# Patient Record
Sex: MALE | URBAN - METROPOLITAN AREA
[De-identification: names, ages, dates, MRNs, and addresses within clinical notes are randomized per-mention and may not be internally consistent; named-entity substitution may affect disease eponyms.]

---

## 2019-07-08 ENCOUNTER — IMPORTED ENCOUNTER (OUTPATIENT)
Dept: URBAN - METROPOLITAN AREA CLINIC 50 | Facility: CLINIC | Age: 74
End: 2019-07-08

## 2019-07-08 NOTE — PATIENT DISCUSSION
"""discussed with patient that the sensation of the pressure felling is probably from his sinuses ""

## 2020-08-06 ENCOUNTER — IMPORTED ENCOUNTER (OUTPATIENT)
Dept: URBAN - METROPOLITAN AREA CLINIC 50 | Facility: CLINIC | Age: 75
End: 2020-08-06

## 2020-08-31 NOTE — PATIENT DISCUSSION
Dendritic Keratitis OS: Prescribed Zirgan 0.15% gel 5x/day OS (gave handwritten script for viroptic 9x/day if unable to obtain zirgan).  RTC as directed for follow-up visit/sooner if symptoms increase/persist.

## 2020-08-31 NOTE — PATIENT DISCUSSION
INSTILL ONE TO TWO DROPS IN THE LEFT EYE AS DIRECTED BY PHYSICIAN EVER Y TWO TO FOUR HOURS FOR THE FIRST TWO DAYS, THEN ONE TO TWO DROPS FOUR

## 2021-04-17 ASSESSMENT — VISUAL ACUITY
OD_CC: J1+@ 14 IN
OS_PH: 20/25
OD_BAT: 20/30
OS_CC: 20/50-1
OD_CC: 20/30+2
OS_CC: 20/40+/-
OD_CC: J2
OD_BAT: 20/50
OS_CC: J2
OD_CC: 20/50+
OS_PH: 20/30+2
OS_BAT: 20/25-
OS_OTHER: 20/40. 20/60.
OD_OTHER: 20/50. 20/80.
OS_OTHER: 20/25-. 20/40.
OS_BAT: 20/40
OD_OTHER: 20/30. 20/30-.
OD_PH: 20/30
OS_CC: J1+@ 14 IN

## 2021-04-17 ASSESSMENT — TONOMETRY
OS_IOP_MMHG: 13
OS_IOP_MMHG: 13
OD_IOP_MMHG: 13
OD_IOP_MMHG: 14

## 2021-07-30 ENCOUNTER — PREPPED CHART (OUTPATIENT)
Dept: URBAN - METROPOLITAN AREA CLINIC 53 | Facility: CLINIC | Age: 76
End: 2021-07-30

## 2021-07-30 ASSESSMENT — VISUAL ACUITY
OD_GLARE: 20/50
OS_GLARE: 20/40
OD_CC: 20/50
OS_GLARE: 20/60
OD_GLARE: 20/80
OU_CC: J2
OS_CC: 20/40
OS_PH: 20/25
OD_PH: 20/30

## 2021-07-30 ASSESSMENT — TONOMETRY
OD_IOP_MMHG: 14
OS_IOP_MMHG: 13

## 2021-08-05 ENCOUNTER — ANNUAL COMPREHENSIVE EXAM (OUTPATIENT)
Dept: URBAN - METROPOLITAN AREA CLINIC 53 | Facility: CLINIC | Age: 76
End: 2021-08-05

## 2021-08-05 DIAGNOSIS — H25.13: ICD-10-CM

## 2021-08-05 DIAGNOSIS — H52.4: ICD-10-CM

## 2021-08-05 DIAGNOSIS — H35.362: ICD-10-CM

## 2021-08-05 PROCEDURE — 92014 COMPRE OPH EXAM EST PT 1/>: CPT

## 2021-08-05 PROCEDURE — 92015 DETERMINE REFRACTIVE STATE: CPT

## 2021-08-05 PROCEDURE — 92134 CPTRZ OPH DX IMG PST SGM RTA: CPT

## 2021-08-05 ASSESSMENT — VISUAL ACUITY
OS_CC: 20/40+2
OS_GLARE: 20/60
OS_GLARE: 20/40
OD_CC: 20/30+1
OS_PH: 20/30
OD_GLARE: 20/50
OU_CC: J1
OD_PH: 20/25
OD_GLARE: 20/150

## 2021-08-05 ASSESSMENT — TONOMETRY
OS_IOP_MMHG: 15
OD_IOP_MMHG: 15

## 2022-08-18 ENCOUNTER — COMPREHENSIVE EXAM (OUTPATIENT)
Dept: URBAN - METROPOLITAN AREA CLINIC 53 | Facility: CLINIC | Age: 77
End: 2022-08-18

## 2022-08-18 DIAGNOSIS — H52.4: ICD-10-CM

## 2022-08-18 DIAGNOSIS — H35.362: ICD-10-CM

## 2022-08-18 DIAGNOSIS — H25.13: ICD-10-CM

## 2022-08-18 PROCEDURE — 92014 COMPRE OPH EXAM EST PT 1/>: CPT

## 2022-08-18 PROCEDURE — 92015 DETERMINE REFRACTIVE STATE: CPT

## 2022-08-18 PROCEDURE — 92134 CPTRZ OPH DX IMG PST SGM RTA: CPT

## 2022-08-18 ASSESSMENT — VISUAL ACUITY
OU_CC: J1-3
OD_GLARE: 20/50
OS_GLARE: 20/50
OS_GLARE: 20/40
OD_PH: 20/25-2
OD_GLARE: 20/40

## 2022-08-18 ASSESSMENT — TONOMETRY
OD_IOP_MMHG: 16
OS_IOP_MMHG: 16

## 2022-10-19 ENCOUNTER — PRE-OP/H&P (OUTPATIENT)
Dept: URBAN - METROPOLITAN AREA CLINIC 53 | Facility: CLINIC | Age: 77
End: 2022-10-19

## 2022-10-19 DIAGNOSIS — H25.13: ICD-10-CM

## 2022-10-19 DIAGNOSIS — H35.372: ICD-10-CM

## 2022-10-19 PROCEDURE — PREOP PRE OP VISIT

## 2022-10-19 PROCEDURE — 92025CV CORNEAL TOPOGRAPHY, CV

## 2022-10-19 PROCEDURE — 92136 OPHTHALMIC BIOMETRY: CPT

## 2022-10-19 PROCEDURE — 92134 CPTRZ OPH DX IMG PST SGM RTA: CPT

## 2022-10-19 ASSESSMENT — KERATOMETRY
OS_AXISANGLE2_DEGREES: 66
OS_AXISANGLE_DEGREES: 156
OD_AXISANGLE2_DEGREES: 127
OD_K2POWER_DIOPTERS: 42.50
OD_K1POWER_DIOPTERS: 43.25
OS_K1POWER_DIOPTERS: 43.00
OS_K2POWER_DIOPTERS: 41.75
OD_AXISANGLE_DEGREES: 37

## 2022-10-19 ASSESSMENT — TONOMETRY
OS_IOP_MMHG: 17
OD_IOP_MMHG: 17

## 2022-10-19 ASSESSMENT — VISUAL ACUITY
OS_CC: 20/50
OS_PH: 20/30-1
OD_CC: 20/40-1

## 2022-10-19 NOTE — PATIENT DISCUSSION
CV OS/OD/DIST OU: Discussed with patient in detail laser-assisted vs traditional cataract surgery and all available lens options as well as their associated risks, benefits, limitations and out-of-pocket costs. Patient is a candidate for [facility] OU. Patient wishes to proceed with cataract surgery with [facility] OS. Proceed with cataract surgery OD with [facility], based on confirmation of first eye results, and patient's complaint. The patient understands that a monofocal IOL will allow him/her to see clearly at one focal point and elects to be best corrected at distance. Glasses will be needed full time for near and intermediate work after surgery and there is no guarantee that they will not also require glasses to see their best at distance. The risks, benefits, and alternatives to surgery were discussed and the patient's questions were answered.

## 2022-10-19 NOTE — PATIENT DISCUSSION
Biometry notes- Pt would like custom vision and understands he will have to wear glasses for up close.  Per Pt he would like the OS done 1st he is scheudled for OD 1st.

## 2022-10-27 ENCOUNTER — SURGERY/PROCEDURE (OUTPATIENT)
Dept: URBAN - METROPOLITAN AREA SURGERY 16 | Facility: SURGERY | Age: 77
End: 2022-10-27

## 2022-10-27 ENCOUNTER — POST-OP (OUTPATIENT)
Dept: URBAN - METROPOLITAN AREA CLINIC 48 | Facility: LOCATION | Age: 77
End: 2022-10-27

## 2022-10-27 DIAGNOSIS — Z98.42: ICD-10-CM

## 2022-10-27 DIAGNOSIS — Z96.1: ICD-10-CM

## 2022-10-27 DIAGNOSIS — H25.12: ICD-10-CM

## 2022-10-27 PROCEDURE — 99199PCV CUSTOM VISION

## 2022-10-27 PROCEDURE — 66984CV REMOVE CATARACT, INSERT LENS, CUSTOM VISION

## 2022-10-27 ASSESSMENT — KERATOMETRY
OS_AXISANGLE_DEGREES: 156
OS_AXISANGLE2_DEGREES: 66
OS_AXISANGLE2_DEGREES: 66
OD_AXISANGLE2_DEGREES: 127
OD_K2POWER_DIOPTERS: 42.50
OS_K1POWER_DIOPTERS: 43.00
OD_K1POWER_DIOPTERS: 43.25
OD_AXISANGLE2_DEGREES: 127
OS_K2POWER_DIOPTERS: 41.75
OD_AXISANGLE_DEGREES: 37
OD_K1POWER_DIOPTERS: 43.25
OS_K2POWER_DIOPTERS: 41.75
OS_K1POWER_DIOPTERS: 43.00
OS_AXISANGLE_DEGREES: 156
OD_AXISANGLE_DEGREES: 37
OD_K2POWER_DIOPTERS: 42.50

## 2022-10-27 ASSESSMENT — VISUAL ACUITY: OS_SC: 20/60

## 2022-10-27 ASSESSMENT — TONOMETRY: OS_IOP_MMHG: 8

## 2022-11-02 ENCOUNTER — POST-OP (OUTPATIENT)
Dept: URBAN - METROPOLITAN AREA CLINIC 53 | Facility: CLINIC | Age: 77
End: 2022-11-02

## 2022-11-02 DIAGNOSIS — Z98.42: ICD-10-CM

## 2022-11-02 DIAGNOSIS — H25.11: ICD-10-CM

## 2022-11-02 PROCEDURE — 92136 - 2N OPHTHALMIC BIOMETRY BY PARTIAL COHERENCE INTERFEROMETRY WITH INTRAOCULAR LENS POWER CALCULATION

## 2022-11-02 PROCEDURE — 99213 OFFICE O/P EST LOW 20 MIN: CPT

## 2022-11-02 ASSESSMENT — VISUAL ACUITY
OD_PH: 20/30-1
OD_CC: 20/40-1
OS_PH: 20/25
OD_GLARE: 20/40
OD_GLARE: 20/50
OS_SC: 20/30

## 2022-11-02 ASSESSMENT — TONOMETRY
OD_IOP_MMHG: 17
OS_IOP_MMHG: 16

## 2022-11-02 ASSESSMENT — KERATOMETRY
OD_K2POWER_DIOPTERS: 42.50
OS_AXISANGLE_DEGREES: 156
OS_K1POWER_DIOPTERS: 43.00
OS_K2POWER_DIOPTERS: 41.75
OS_AXISANGLE2_DEGREES: 66
OD_K1POWER_DIOPTERS: 43.25
OD_AXISANGLE_DEGREES: 37
OD_AXISANGLE2_DEGREES: 127

## 2022-11-10 ENCOUNTER — POST-OP (OUTPATIENT)
Dept: URBAN - METROPOLITAN AREA CLINIC 49 | Facility: CLINIC | Age: 77
End: 2022-11-10

## 2022-11-10 ENCOUNTER — SURGERY/PROCEDURE (OUTPATIENT)
Dept: URBAN - METROPOLITAN AREA SURGERY 16 | Facility: SURGERY | Age: 77
End: 2022-11-10

## 2022-11-10 DIAGNOSIS — H25.11: ICD-10-CM

## 2022-11-10 DIAGNOSIS — Z98.41: ICD-10-CM

## 2022-11-10 PROCEDURE — 99199PCV CUSTOM VISION

## 2022-11-10 PROCEDURE — 66984CV REMOVE CATARACT, INSERT LENS, CUSTOM VISION

## 2022-11-10 ASSESSMENT — KERATOMETRY
OD_K2POWER_DIOPTERS: 42.50
OS_AXISANGLE2_DEGREES: 66
OS_AXISANGLE_DEGREES: 156
OS_K2POWER_DIOPTERS: 41.75
OD_K1POWER_DIOPTERS: 43.25
OS_K1POWER_DIOPTERS: 43.00
OD_AXISANGLE_DEGREES: 37
OD_AXISANGLE2_DEGREES: 127

## 2022-11-10 ASSESSMENT — VISUAL ACUITY: OD_SC: 20/60-1

## 2022-11-10 ASSESSMENT — TONOMETRY: OD_IOP_MMHG: 17

## 2022-11-16 ENCOUNTER — POST-OP (OUTPATIENT)
Dept: URBAN - METROPOLITAN AREA CLINIC 53 | Facility: CLINIC | Age: 77
End: 2022-11-16

## 2022-11-16 DIAGNOSIS — Z96.1: ICD-10-CM

## 2022-11-16 DIAGNOSIS — Z98.41: ICD-10-CM

## 2022-11-16 PROCEDURE — 99024 POSTOP FOLLOW-UP VISIT: CPT

## 2022-11-16 ASSESSMENT — VISUAL ACUITY
OD_PH: 20/25-1
OS_SC: 20/20
OD_SC: 20/30-1
OU_CC: J1+

## 2022-11-16 ASSESSMENT — KERATOMETRY
OD_K1POWER_DIOPTERS: 41.75
OS_AXISANGLE2_DEGREES: 159
OD_K2POWER_DIOPTERS: 43.50
OS_AXISANGLE_DEGREES: 69
OS_K1POWER_DIOPTERS: 42.25
OS_K2POWER_DIOPTERS: 43.75
OD_AXISANGLE_DEGREES: 108
OD_AXISANGLE2_DEGREES: 18

## 2022-11-16 ASSESSMENT — TONOMETRY
OD_IOP_MMHG: 16
OS_IOP_MMHG: 16

## 2022-12-07 ENCOUNTER — POST-OP (OUTPATIENT)
Dept: URBAN - METROPOLITAN AREA CLINIC 53 | Facility: CLINIC | Age: 77
End: 2022-12-07

## 2022-12-07 PROCEDURE — 92015 DETERMINE REFRACTIVE STATE: CPT

## 2022-12-07 PROCEDURE — 99024 POSTOP FOLLOW-UP VISIT: CPT

## 2022-12-07 ASSESSMENT — KERATOMETRY
OD_AXISANGLE2_DEGREES: 18
OS_K1POWER_DIOPTERS: 42.25
OS_AXISANGLE2_DEGREES: 159
OD_K1POWER_DIOPTERS: 41.75
OS_AXISANGLE_DEGREES: 69
OD_AXISANGLE_DEGREES: 108
OS_K2POWER_DIOPTERS: 43.75
OD_K2POWER_DIOPTERS: 43.50

## 2022-12-07 ASSESSMENT — VISUAL ACUITY
OD_SC: 20/25-1
OU_SC: J1(-1) @16IN
OS_SC: 20/25+2

## 2022-12-07 ASSESSMENT — TONOMETRY
OD_IOP_MMHG: 14
OS_IOP_MMHG: 13

## 2023-06-01 ENCOUNTER — COMPREHENSIVE EXAM (OUTPATIENT)
Dept: URBAN - METROPOLITAN AREA CLINIC 53 | Facility: CLINIC | Age: 78
End: 2023-06-01

## 2023-06-01 DIAGNOSIS — H43.813: ICD-10-CM

## 2023-06-01 DIAGNOSIS — H35.362: ICD-10-CM

## 2023-06-01 DIAGNOSIS — H26.493: ICD-10-CM

## 2023-06-01 DIAGNOSIS — H53.2: ICD-10-CM

## 2023-06-01 PROCEDURE — 92134 CPTRZ OPH DX IMG PST SGM RTA: CPT

## 2023-06-01 PROCEDURE — 92014 COMPRE OPH EXAM EST PT 1/>: CPT

## 2023-06-01 ASSESSMENT — VISUAL ACUITY
OU_SC: 20/25-1
OD_GLARE: 20/20
OD_GLARE: 20/25
OS_SC: 20/25-1
OD_SC: 20/25
OS_GLARE: 20/25
OS_GLARE: 20/25
OU_CC: J2 @ 16IN

## 2023-06-01 ASSESSMENT — TONOMETRY
OS_IOP_MMHG: 16
OD_IOP_MMHG: 14

## 2023-06-19 ENCOUNTER — CONTACT LENSES/GLASSES VISIT (OUTPATIENT)
Dept: URBAN - METROPOLITAN AREA CLINIC 53 | Facility: CLINIC | Age: 78
End: 2023-06-19

## 2023-06-19 DIAGNOSIS — H53.2: ICD-10-CM

## 2023-06-19 PROCEDURE — 92060 SENSORIMOTOR EXAMINATION: CPT

## 2023-06-19 ASSESSMENT — VISUAL ACUITY
OS_SC: 20/20
OU_SC: 20/20
OD_SC: 20/20

## 2024-06-06 ENCOUNTER — COMPREHENSIVE EXAM (OUTPATIENT)
Dept: URBAN - METROPOLITAN AREA CLINIC 53 | Facility: CLINIC | Age: 79
End: 2024-06-06

## 2024-06-06 DIAGNOSIS — H52.4: ICD-10-CM

## 2024-06-06 DIAGNOSIS — H26.493: ICD-10-CM

## 2024-06-06 DIAGNOSIS — H43.813: ICD-10-CM

## 2024-06-06 DIAGNOSIS — H53.2: ICD-10-CM

## 2024-06-06 PROCEDURE — 92015 DETERMINE REFRACTIVE STATE: CPT

## 2024-06-06 PROCEDURE — 99214 OFFICE O/P EST MOD 30 MIN: CPT

## 2024-06-06 PROCEDURE — 92134 CPTRZ OPH DX IMG PST SGM RTA: CPT

## 2024-06-06 ASSESSMENT — VISUAL ACUITY
OD_GLARE: 20/70
OS_GLARE: 20/30
OD_GLARE: 20/40
OU_CC: J1
OS_GLARE: 20/50
OD_PH: 20/25
OS_SC: 20/25
OD_SC: 20/30

## 2024-06-06 ASSESSMENT — TONOMETRY
OD_IOP_MMHG: 17
OS_IOP_MMHG: 16

## 2025-02-04 ENCOUNTER — CONTACT LENSES/GLASSES VISIT (OUTPATIENT)
Age: 80
End: 2025-02-04

## 2025-02-04 DIAGNOSIS — H53.2: ICD-10-CM

## 2025-02-04 DIAGNOSIS — H52.4: ICD-10-CM

## 2025-02-04 PROCEDURE — 92060 SENSORIMOTOR EXAMINATION: CPT

## 2025-02-04 PROCEDURE — 92015 DETERMINE REFRACTIVE STATE: CPT

## 2025-04-01 ENCOUNTER — EMERGENCY VISIT (OUTPATIENT)
Age: 80
End: 2025-04-01

## 2025-04-01 DIAGNOSIS — H43.813: ICD-10-CM

## 2025-04-01 DIAGNOSIS — H40.013: ICD-10-CM

## 2025-04-01 DIAGNOSIS — H26.493: ICD-10-CM

## 2025-04-01 PROCEDURE — 76514 ECHO EXAM OF EYE THICKNESS: CPT

## 2025-04-01 PROCEDURE — 66821 AFTER CATARACT LASER SURGERY: CPT

## 2025-04-01 PROCEDURE — 92134 CPTRZ OPH DX IMG PST SGM RTA: CPT

## 2025-04-01 PROCEDURE — 99215 OFFICE O/P EST HI 40 MIN: CPT | Mod: 57

## 2025-04-15 ENCOUNTER — CLINIC PROCEDURE ONLY (OUTPATIENT)
Age: 80
End: 2025-04-15

## 2025-04-15 DIAGNOSIS — H26.491: ICD-10-CM

## 2025-04-15 PROCEDURE — 66821 AFTER CATARACT LASER SURGERY: CPT | Mod: 79,RT

## 2025-05-20 ENCOUNTER — POST-OP (OUTPATIENT)
Age: 80
End: 2025-05-20

## 2025-05-20 DIAGNOSIS — Z98.890: ICD-10-CM

## 2025-05-20 PROCEDURE — 99024 POSTOP FOLLOW-UP VISIT: CPT

## 2025-05-20 RX ORDER — TOBRAMYCIN AND DEXAMETHASONE 1; 3 MG/ML; MG/ML
1 SUSPENSION/ DROPS OPHTHALMIC
Start: 2025-05-20